# Patient Record
Sex: MALE | Race: WHITE | NOT HISPANIC OR LATINO | Employment: FULL TIME | URBAN - NONMETROPOLITAN AREA
[De-identification: names, ages, dates, MRNs, and addresses within clinical notes are randomized per-mention and may not be internally consistent; named-entity substitution may affect disease eponyms.]

---

## 2018-08-12 ENCOUNTER — DOCUMENTATION (OUTPATIENT)
Dept: RETAIL CLINIC | Facility: CLINIC | Age: 38
End: 2018-08-12

## 2018-08-12 ENCOUNTER — OFFICE VISIT (OUTPATIENT)
Dept: RETAIL CLINIC | Facility: CLINIC | Age: 38
End: 2018-08-12

## 2018-08-12 VITALS
RESPIRATION RATE: 18 BRPM | SYSTOLIC BLOOD PRESSURE: 120 MMHG | DIASTOLIC BLOOD PRESSURE: 76 MMHG | HEART RATE: 84 BPM | OXYGEN SATURATION: 98 % | TEMPERATURE: 97.5 F

## 2018-08-12 DIAGNOSIS — S81.832A PUNCTURE WOUND OF LEFT LOWER LEG, INITIAL ENCOUNTER: Primary | ICD-10-CM

## 2018-08-12 PROCEDURE — 99203 OFFICE O/P NEW LOW 30 MIN: CPT | Performed by: NURSE PRACTITIONER

## 2018-08-12 RX ORDER — DOXYCYCLINE HYCLATE 100 MG/1
100 TABLET, DELAYED RELEASE ORAL 2 TIMES DAILY
Qty: 20 TABLET | Refills: 0 | Status: SHIPPED | OUTPATIENT
Start: 2018-08-12 | End: 2018-08-12

## 2018-08-12 RX ORDER — METHADONE HYDROCHLORIDE 10 MG/1
10 TABLET ORAL EVERY 6 HOURS PRN
COMMUNITY

## 2018-08-12 RX ORDER — CIPROFLOXACIN 750 MG/1
750 TABLET, FILM COATED ORAL EVERY 12 HOURS SCHEDULED
Qty: 20 TABLET | Refills: 0 | Status: SHIPPED | OUTPATIENT
Start: 2018-08-12 | End: 2018-08-22

## 2018-08-12 NOTE — PROGRESS NOTES
Called [facility] and patient. Changed antibiotic to Cipro due to nature of injury and since he got injury in fresh water. Cautioned patient about taking the medication with Methadone and he reports he only takes 1/2 to a 1/4 of his usual dose unless he has increased pain. Also informed him of risk of tendon ruptures especially in achilles tendon as well has heart palpitation or feeling of heart racing. He is to stop the medication if he has any unexplained symptoms.

## 2018-08-12 NOTE — PATIENT INSTRUCTIONS
Follow up tomorrow if symptoms do not improve  Go to emergency room if symptoms worsen-develops adenopathy in left groin, fever, red streaks, worsening of swelling or pain  Take a doxycycline now and then one before bed.     Puncture Wound  A puncture wound is an injury that is caused by a sharp, thin object that goes through (penetrates) your skin. Usually, a puncture wound does not leave a large opening in your skin, so it may not bleed a lot. However, when you get a puncture wound, dirt or other materials (foreign bodies) can be forced into your wound and break off inside. This increases the chance of infection, such as tetanus.  What are the causes?  Puncture wounds are caused by any sharp, thin object that goes through your skin, such as:  · Animal teeth, as with an animal bite.  · Sharp, pointed objects, such as nails, splinters of glass, fishhooks, and needles.    What are the signs or symptoms?  Symptoms of a puncture wound include:  · Pain.  · Bleeding.  · Swelling.  · Bruising.  · Fluid leaking from the wound.  · Numbness, tingling, or loss of function.    How is this diagnosed?  This condition is diagnosed with a medical history and physical exam. Your wound will be checked to see if it contains any foreign bodies. You may also have X-rays or other imaging tests.  How is this treated?  Treatment for a puncture wound depends on how serious the wound is. It also depends on whether the wound contains any foreign bodies. Treatment for all types of puncture wounds usually starts with:  · Controlling the bleeding.  · Washing out the wound with a germ-free (sterile) salt-water solution.  · Checking the wound for foreign bodies.    Treatment may also include:  · Having the wound opened surgically to remove a foreign object.  · Closing the wound with stitches (sutures) if it continues to bleed.  · Covering the wound with antibiotic ointments and a bandage (dressing).  · Receiving a tetanus shot.  · Receiving a  rabies vaccine.    Follow these instructions at home:  Medicines  · Take or apply over-the-counter and prescription medicines only as told by your health care provider.  · If you were prescribed an antibiotic, take or apply it as told by your health care provider. Do not stop using the antibiotic even if your condition improves.  Wound care  · There are many ways to close and cover a wound. For example, a wound can be covered with sutures, skin glue, or adhesive strips. Follow instructions from your health care provider about:  ? How to take care of your wound.  ? When and how you should change your dressing.  ? When you should remove your dressing.  ? Removing whatever was used to close your wound.  · Keep the dressing dry as told by your health care provider. Do not take baths, swim, use a hot tub, or do anything that would put your wound underwater until your health care provider approves.  · Clean the wound as told by your health care provider.  · Do not scratch or pick at the wound.  · Check your wound every day for signs of infection. Watch for:  ? Redness, swelling, or pain.  ? Fluid, blood, or pus.  General instructions  · Raise (elevate) the injured area above the level of your heart while you are sitting or lying down.  · If your puncture wound is in your foot, ask your health care provider if you need to avoid putting weight on your foot and for how long.  · Keep all follow-up visits as told by your health care provider. This is important.  Contact a health care provider if:  · You received a tetanus shot and you have swelling, severe pain, redness, or bleeding at the injection site.  · You have a fever.  · Your sutures come out.  · You notice a bad smell coming from your wound or your dressing.  · You notice something coming out of your wound, such as wood or glass.  · Your pain is not controlled with medicine.  · You have increased redness, swelling, or pain at the site of your wound.  · You have fluid,  blood, or pus coming from your wound.  · You notice a change in the color of your skin near your wound.  · You need to change the dressing frequently due to fluid, blood, or pus draining from your wound.  · You develop a new rash.  · You develop numbness around your wound.  Get help right away if:  · You develop severe swelling around your wound.  · Your pain suddenly increases and is severe.  · You develop painful skin lumps.  · You have a red streak going away from your wound.  · The wound is on your hand or foot and you cannot properly move a finger or toe.  · The wound is on your hand or foot and you notice that your fingers or toes look pale or bluish.  This information is not intended to replace advice given to you by your health care provider. Make sure you discuss any questions you have with your health care provider.  Document Released: 09/27/2006 Document Revised: 05/22/2017 Document Reviewed: 02/10/2016  [employer] Interactive Patient Education © 2018 [employer]

## 2018-08-12 NOTE — PROGRESS NOTES
Chief Complaint   Patient presents with   • Puncture Wound     Subjective   Brandan Garcia is a 38 y.o. male who presents to the clinic today with complaints puncture wound in left calf[place]. He is not sure what he hit. It has been approximately 27 hours ago. He had increased tenderness and redness this morning. He has nurses in his family and they cleaned and irrigated wound with warm water. They wanted him to go have this checked after it happened, but he decided to wait. He had Tetanus vaccine last December at the clinic where he works in [place]. He works in coal mine.   Puncture Wound   The current episode started yesterday. The problem occurs constantly. The problem has been gradually worsening. Pertinent negatives include no abdominal pain, anorexia, arthralgias, change in bowel habit, chest pain, chills, congestion, coughing, diaphoresis, fatigue, fever, headaches, joint swelling, myalgias, nausea, neck pain, numbness, rash, sore throat, swollen glands, urinary symptoms, vertigo, visual change, vomiting or weakness. Nothing aggravates the symptoms. Treatments tried: irrigated wound well with warm water. The treatment provided no relief.         Current Outpatient Prescriptions:   •  methadone (DOLOPHINE) 10 MG tablet, Take 10 mg by mouth Every 6 (Six) Hours As Needed for Moderate Pain ,, Disp: , Rfl:   •  ciprofloxacin (CIPRO) 750 MG tablet, Take 1 tablet by mouth Every 12 (Twelve) Hours for 10 days., Disp: 20 tablet, Rfl: 0    Allergies:  Patient has no known allergies.    Past Medical History:   Diagnosis Date   • Chronic pain disorder      History reviewed. No pertinent surgical history.  Family History   Problem Relation Age of Onset   • Obesity Mother    • Cancer Father    • No Known Problems Brother    • CHAPO disease Maternal Grandfather    • Cancer Paternal Grandmother      Social History   Substance Use Topics   • Smoking status: Current Every Day  Smoker     Packs/day: 0.50     Years: 20.00     Types: Cigarettes   • Smokeless tobacco: Never Used   • Alcohol use Yes      Comment: occ       Review of Systems  Review of Systems   Constitutional: Negative for chills, diaphoresis, fatigue and fever.   HENT: Negative for congestion and sore throat.    Respiratory: Negative for cough.    Cardiovascular: Negative for chest pain.   Gastrointestinal: Negative for abdominal pain, anorexia, change in bowel habit, nausea and vomiting.   Musculoskeletal: Negative for arthralgias, joint swelling, myalgias and neck pain.   Skin: Negative for rash.   Neurological: Negative for vertigo, weakness, numbness and headaches.       Objective   /76 (BP Location: Left arm, Patient Position: Sitting, Cuff Size: Adult)   Pulse 84   Temp 97.5 °F (36.4 °C) (Tympanic)   Resp 18   SpO2 98%       Physical Exam   Constitutional: He appears well-developed and well-nourished.   HENT:   Head: Normocephalic and atraumatic.   Eyes: Pupils are equal, round, and reactive to light. EOM are normal.   Cardiovascular: Normal rate, regular rhythm and normal heart sounds.    Pulmonary/Chest: Effort normal and breath sounds normal.   Lymphadenopathy:        Left: No inguinal adenopathy present.   Skin: Skin is warm and dry.   Puncture wound in left calf. About 4 mm with erythema and edema around wound. Wound has scabbed over, but can express purulent drainage from pin hole opening.    Psychiatric: He has a normal mood and affect. His behavior is normal.   Vitals reviewed.      Assessment/Plan     Brandan was seen today for puncture wound.    Diagnoses and all orders for this visit:    Puncture wound of left lower leg, initial encounter    Other orders  -     Discontinue: doxycycline (DORYX) 100 MG enteric coated tablet; Take 1 tablet by mouth 2 (Two) Times a Day for 10 days.  -     ciprofloxacin (CIPRO) 750 MG tablet; Take 1 tablet by mouth Every 12 (Twelve) Hours for 10 days.    Follow up tomorrow  if symptoms do not improve  Go to emergency room if symptoms worsen-develops adenopathy in left groin, fever, red streaks, worsening of swelling or pain  Take a doxycycline now and then one before bed.   Up to date on Tetanus had one last December in [place], [place].   Recommend he stay home from work, but he declines. States he has a position in which he can do as much or little work as possible.

## 2018-08-13 ENCOUNTER — TELEPHONE (OUTPATIENT)
Dept: RETAIL CLINIC | Facility: CLINIC | Age: 38
End: 2018-08-13

## 2018-08-13 NOTE — TELEPHONE ENCOUNTER
"Called to follow up with patient.  States he thinks it is about the same with a \"tiny\" bit of improvement.  He had mad markings on the site yesterday and believes the swelling has gone down just a small bit inside the markings.  Denies any worsening symptoms.  Instructed to go to ER for any worsening symptoms at any point.  Otherwise, may re-evaluate again in the morning.  If no improvement, will need follow up with higher level of care.   "

## 2019-05-29 ENCOUNTER — TRANSCRIBE ORDERS (OUTPATIENT)
Dept: GENERAL RADIOLOGY | Facility: CLINIC | Age: 39
End: 2019-05-29

## 2019-05-29 DIAGNOSIS — Z00.00 ROUTINE GENERAL MEDICAL EXAMINATION AT A HEALTH CARE FACILITY: Primary | ICD-10-CM
